# Patient Record
Sex: FEMALE | Race: WHITE | Employment: OTHER | URBAN - METROPOLITAN AREA
[De-identification: names, ages, dates, MRNs, and addresses within clinical notes are randomized per-mention and may not be internally consistent; named-entity substitution may affect disease eponyms.]

---

## 2021-02-24 ENCOUNTER — HOSPITAL ENCOUNTER (OUTPATIENT)
Dept: PREADMISSION TESTING | Age: 41
Discharge: HOME | End: 2021-02-24
Payer: COMMERCIAL

## 2021-02-24 PROCEDURE — U0005 INFEC AGEN DETEC AMPLI PROBE: HCPCS

## 2021-02-25 LAB — SARS-COV-2, COV2NT: NOT DETECTED

## 2021-03-01 ENCOUNTER — ANESTHESIA EVENT (OUTPATIENT)
Dept: SURGERY | Age: 41
End: 2021-03-01
Payer: COMMERCIAL

## 2021-03-02 ENCOUNTER — ANESTHESIA (OUTPATIENT)
Dept: SURGERY | Age: 41
End: 2021-03-02
Payer: COMMERCIAL

## 2021-03-02 ENCOUNTER — APPOINTMENT (OUTPATIENT)
Dept: GENERAL RADIOLOGY | Age: 41
End: 2021-03-02
Attending: UROLOGY
Payer: COMMERCIAL

## 2021-03-02 ENCOUNTER — HOSPITAL ENCOUNTER (OUTPATIENT)
Age: 41
Setting detail: OUTPATIENT SURGERY
Discharge: HOME | End: 2021-03-02
Attending: UROLOGY | Admitting: UROLOGY
Payer: COMMERCIAL

## 2021-03-02 VITALS
SYSTOLIC BLOOD PRESSURE: 153 MMHG | WEIGHT: 203 LBS | DIASTOLIC BLOOD PRESSURE: 88 MMHG | RESPIRATION RATE: 16 BRPM | HEART RATE: 81 BPM | TEMPERATURE: 98.2 F | OXYGEN SATURATION: 95 % | BODY MASS INDEX: 30.77 KG/M2 | HEIGHT: 68 IN

## 2021-03-02 DIAGNOSIS — N13.2 URETERAL STONE WITH HYDRONEPHROSIS: Primary | ICD-10-CM

## 2021-03-02 LAB
ATRIAL RATE: 77 BPM
CALCULATED P AXIS, ECG09: 60 DEGREES
CALCULATED R AXIS, ECG10: -102 DEGREES
CALCULATED T AXIS, ECG11: 51 DEGREES
DIAGNOSIS, 93000: NORMAL
HCG UR QL: NEGATIVE
P-R INTERVAL, ECG05: 156 MS
Q-T INTERVAL, ECG07: 386 MS
QRS DURATION, ECG06: 96 MS
QTC CALCULATION (BEZET), ECG08: 436 MS
VENTRICULAR RATE, ECG03: 77 BPM

## 2021-03-02 PROCEDURE — 74011250637 HC RX REV CODE- 250/637: Performed by: SPECIALIST

## 2021-03-02 PROCEDURE — C1758 CATHETER, URETERAL: HCPCS | Performed by: UROLOGY

## 2021-03-02 PROCEDURE — 74011000636 HC RX REV CODE- 636: Performed by: UROLOGY

## 2021-03-02 PROCEDURE — 77030006974 HC BSKT URET RTVR BSC -C: Performed by: UROLOGY

## 2021-03-02 PROCEDURE — 74011250636 HC RX REV CODE- 250/636: Performed by: UROLOGY

## 2021-03-02 PROCEDURE — 74011000250 HC RX REV CODE- 250: Performed by: SPECIALIST

## 2021-03-02 PROCEDURE — 81025 URINE PREGNANCY TEST: CPT

## 2021-03-02 PROCEDURE — 74011250636 HC RX REV CODE- 250/636: Performed by: SPECIALIST

## 2021-03-02 PROCEDURE — C2617 STENT, NON-COR, TEM W/O DEL: HCPCS | Performed by: UROLOGY

## 2021-03-02 PROCEDURE — C1726 CATH, BAL DIL, NON-VASCULAR: HCPCS | Performed by: UROLOGY

## 2021-03-02 PROCEDURE — 77030014023 HC SYR INFL LVEEN BSC -B: Performed by: UROLOGY

## 2021-03-02 PROCEDURE — 77010033678 HC OXYGEN DAILY

## 2021-03-02 PROCEDURE — 93005 ELECTROCARDIOGRAM TRACING: CPT

## 2021-03-02 PROCEDURE — 76010000138 HC OR TIME 0.5 TO 1 HR: Performed by: UROLOGY

## 2021-03-02 PROCEDURE — 74420 UROGRAPHY RTRGR +-KUB: CPT

## 2021-03-02 PROCEDURE — 77030020782 HC GWN BAIR PAWS FLX 3M -B: Performed by: UROLOGY

## 2021-03-02 PROCEDURE — 76060000032 HC ANESTHESIA 0.5 TO 1 HR: Performed by: UROLOGY

## 2021-03-02 PROCEDURE — 82360 CALCULUS ASSAY QUANT: CPT

## 2021-03-02 PROCEDURE — 76210000006 HC OR PH I REC 0.5 TO 1 HR: Performed by: UROLOGY

## 2021-03-02 PROCEDURE — 77030010103 HC SEAL PRT ENDOSC OCOA -B: Performed by: UROLOGY

## 2021-03-02 PROCEDURE — 77030040361 HC SLV COMPR DVT MDII -B: Performed by: UROLOGY

## 2021-03-02 PROCEDURE — 76210000021 HC REC RM PH II 0.5 TO 1 HR: Performed by: UROLOGY

## 2021-03-02 PROCEDURE — 2709999900 HC NON-CHARGEABLE SUPPLY: Performed by: UROLOGY

## 2021-03-02 PROCEDURE — C1769 GUIDE WIRE: HCPCS | Performed by: UROLOGY

## 2021-03-02 DEVICE — URETERAL STENT
Type: IMPLANTABLE DEVICE | Site: URETER | Status: FUNCTIONAL
Brand: POLARIS™ ULTRA

## 2021-03-02 RX ORDER — FENTANYL CITRATE 50 UG/ML
25 INJECTION, SOLUTION INTRAMUSCULAR; INTRAVENOUS
Status: DISCONTINUED | OUTPATIENT
Start: 2021-03-02 | End: 2021-03-02 | Stop reason: HOSPADM

## 2021-03-02 RX ORDER — MIDAZOLAM HYDROCHLORIDE 1 MG/ML
INJECTION, SOLUTION INTRAMUSCULAR; INTRAVENOUS AS NEEDED
Status: DISCONTINUED | OUTPATIENT
Start: 2021-03-02 | End: 2021-03-02 | Stop reason: HOSPADM

## 2021-03-02 RX ORDER — LIDOCAINE HYDROCHLORIDE 20 MG/ML
INJECTION, SOLUTION EPIDURAL; INFILTRATION; INTRACAUDAL; PERINEURAL AS NEEDED
Status: DISCONTINUED | OUTPATIENT
Start: 2021-03-02 | End: 2021-03-02 | Stop reason: HOSPADM

## 2021-03-02 RX ORDER — LABETALOL HYDROCHLORIDE 5 MG/ML
10 INJECTION, SOLUTION INTRAVENOUS ONCE
Status: DISCONTINUED | OUTPATIENT
Start: 2021-03-02 | End: 2021-03-02 | Stop reason: HOSPADM

## 2021-03-02 RX ORDER — CEFDINIR 300 MG/1
300 CAPSULE ORAL 2 TIMES DAILY
Qty: 6 CAP | Refills: 0 | Status: SHIPPED | OUTPATIENT
Start: 2021-03-02 | End: 2021-03-05

## 2021-03-02 RX ORDER — DEXAMETHASONE SODIUM PHOSPHATE 4 MG/ML
INJECTION, SOLUTION INTRA-ARTICULAR; INTRALESIONAL; INTRAMUSCULAR; INTRAVENOUS; SOFT TISSUE AS NEEDED
Status: DISCONTINUED | OUTPATIENT
Start: 2021-03-02 | End: 2021-03-02 | Stop reason: HOSPADM

## 2021-03-02 RX ORDER — ONDANSETRON 2 MG/ML
4 INJECTION INTRAMUSCULAR; INTRAVENOUS ONCE
Status: DISCONTINUED | OUTPATIENT
Start: 2021-03-02 | End: 2021-03-02 | Stop reason: HOSPADM

## 2021-03-02 RX ORDER — OXYCODONE AND ACETAMINOPHEN 5; 325 MG/1; MG/1
1 TABLET ORAL
Qty: 20 TAB | Refills: 0 | Status: SHIPPED | OUTPATIENT
Start: 2021-03-02 | End: 2021-03-07

## 2021-03-02 RX ORDER — SODIUM CHLORIDE, SODIUM LACTATE, POTASSIUM CHLORIDE, CALCIUM CHLORIDE 600; 310; 30; 20 MG/100ML; MG/100ML; MG/100ML; MG/100ML
125 INJECTION, SOLUTION INTRAVENOUS CONTINUOUS
Status: DISCONTINUED | OUTPATIENT
Start: 2021-03-02 | End: 2021-03-02 | Stop reason: HOSPADM

## 2021-03-02 RX ORDER — SODIUM CHLORIDE, SODIUM LACTATE, POTASSIUM CHLORIDE, CALCIUM CHLORIDE 600; 310; 30; 20 MG/100ML; MG/100ML; MG/100ML; MG/100ML
50 INJECTION, SOLUTION INTRAVENOUS CONTINUOUS
Status: DISCONTINUED | OUTPATIENT
Start: 2021-03-02 | End: 2021-03-02 | Stop reason: HOSPADM

## 2021-03-02 RX ORDER — PROPOFOL 10 MG/ML
INJECTION, EMULSION INTRAVENOUS AS NEEDED
Status: DISCONTINUED | OUTPATIENT
Start: 2021-03-02 | End: 2021-03-02 | Stop reason: HOSPADM

## 2021-03-02 RX ORDER — FENTANYL CITRATE 50 UG/ML
INJECTION, SOLUTION INTRAMUSCULAR; INTRAVENOUS AS NEEDED
Status: DISCONTINUED | OUTPATIENT
Start: 2021-03-02 | End: 2021-03-02 | Stop reason: HOSPADM

## 2021-03-02 RX ORDER — CEFAZOLIN SODIUM/WATER 2 G/20 ML
2 SYRINGE (ML) INTRAVENOUS ONCE
Status: COMPLETED | OUTPATIENT
Start: 2021-03-02 | End: 2021-03-02

## 2021-03-02 RX ORDER — HYDROMORPHONE HYDROCHLORIDE 1 MG/ML
0.5 INJECTION, SOLUTION INTRAMUSCULAR; INTRAVENOUS; SUBCUTANEOUS
Status: DISCONTINUED | OUTPATIENT
Start: 2021-03-02 | End: 2021-03-02 | Stop reason: HOSPADM

## 2021-03-02 RX ORDER — KETAMINE HYDROCHLORIDE 10 MG/ML
INJECTION, SOLUTION INTRAMUSCULAR; INTRAVENOUS AS NEEDED
Status: DISCONTINUED | OUTPATIENT
Start: 2021-03-02 | End: 2021-03-02 | Stop reason: HOSPADM

## 2021-03-02 RX ORDER — ONDANSETRON 2 MG/ML
INJECTION INTRAMUSCULAR; INTRAVENOUS AS NEEDED
Status: DISCONTINUED | OUTPATIENT
Start: 2021-03-02 | End: 2021-03-02 | Stop reason: HOSPADM

## 2021-03-02 RX ORDER — NALOXONE HYDROCHLORIDE 0.4 MG/ML
0.1 INJECTION, SOLUTION INTRAMUSCULAR; INTRAVENOUS; SUBCUTANEOUS
Status: DISCONTINUED | OUTPATIENT
Start: 2021-03-02 | End: 2021-03-02 | Stop reason: HOSPADM

## 2021-03-02 RX ORDER — OXYCODONE AND ACETAMINOPHEN 5; 325 MG/1; MG/1
1 TABLET ORAL AS NEEDED
Status: DISCONTINUED | OUTPATIENT
Start: 2021-03-02 | End: 2021-03-02 | Stop reason: HOSPADM

## 2021-03-02 RX ADMIN — KETAMINE HYDROCHLORIDE 10 MG: 10 INJECTION, SOLUTION INTRAMUSCULAR; INTRAVENOUS at 12:55

## 2021-03-02 RX ADMIN — MIDAZOLAM 2 MG: 1 INJECTION INTRAMUSCULAR; INTRAVENOUS at 12:50

## 2021-03-02 RX ADMIN — LIDOCAINE HYDROCHLORIDE 60 MG: 20 INJECTION, SOLUTION EPIDURAL; INFILTRATION; INTRACAUDAL; PERINEURAL at 12:56

## 2021-03-02 RX ADMIN — DEXAMETHASONE SODIUM PHOSPHATE 4 MG: 4 INJECTION, SOLUTION INTRAMUSCULAR; INTRAVENOUS at 13:01

## 2021-03-02 RX ADMIN — FENTANYL CITRATE 25 MCG: 50 INJECTION, SOLUTION INTRAMUSCULAR; INTRAVENOUS at 13:15

## 2021-03-02 RX ADMIN — CEFAZOLIN 2 G: 1 INJECTION, POWDER, FOR SOLUTION INTRAVENOUS at 12:50

## 2021-03-02 RX ADMIN — OXYCODONE HYDROCHLORIDE AND ACETAMINOPHEN 1 TABLET: 5; 325 TABLET ORAL at 14:59

## 2021-03-02 RX ADMIN — PROPOFOL 160 MG: 10 INJECTION, EMULSION INTRAVENOUS at 12:56

## 2021-03-02 RX ADMIN — SODIUM CHLORIDE, SODIUM LACTATE, POTASSIUM CHLORIDE, AND CALCIUM CHLORIDE 125 ML/HR: 600; 310; 30; 20 INJECTION, SOLUTION INTRAVENOUS at 10:50

## 2021-03-02 RX ADMIN — KETAMINE HYDROCHLORIDE 10 MG: 10 INJECTION, SOLUTION INTRAMUSCULAR; INTRAVENOUS at 12:57

## 2021-03-02 RX ADMIN — FENTANYL CITRATE 25 MCG: 50 INJECTION, SOLUTION INTRAMUSCULAR; INTRAVENOUS at 13:30

## 2021-03-02 RX ADMIN — KETAMINE HYDROCHLORIDE 10 MG: 10 INJECTION, SOLUTION INTRAMUSCULAR; INTRAVENOUS at 12:59

## 2021-03-02 RX ADMIN — FENTANYL CITRATE 25 MCG: 50 INJECTION, SOLUTION INTRAMUSCULAR; INTRAVENOUS at 14:03

## 2021-03-02 RX ADMIN — SODIUM CHLORIDE, SODIUM LACTATE, POTASSIUM CHLORIDE, AND CALCIUM CHLORIDE: 600; 310; 30; 20 INJECTION, SOLUTION INTRAVENOUS at 13:22

## 2021-03-02 RX ADMIN — ONDANSETRON HYDROCHLORIDE 4 MG: 2 INJECTION INTRAMUSCULAR; INTRAVENOUS at 13:01

## 2021-03-02 RX ADMIN — FENTANYL CITRATE 25 MCG: 50 INJECTION, SOLUTION INTRAMUSCULAR; INTRAVENOUS at 13:00

## 2021-03-02 RX ADMIN — FENTANYL CITRATE 25 MCG: 50 INJECTION, SOLUTION INTRAMUSCULAR; INTRAVENOUS at 13:51

## 2021-03-02 RX ADMIN — FENTANYL CITRATE 25 MCG: 50 INJECTION, SOLUTION INTRAMUSCULAR; INTRAVENOUS at 12:55

## 2021-03-02 NOTE — ANESTHESIA PREPROCEDURE EVALUATION
Relevant Problems   No relevant active problems       Anesthetic History   No history of anesthetic complications            Review of Systems / Medical History  Patient summary reviewed, nursing notes reviewed and pertinent labs reviewed    Pulmonary  Within defined limits                 Neuro/Psych   Within defined limits           Cardiovascular  Within defined limits              Pertinent negatives: No hypertension  Exercise tolerance: >4 METS     GI/Hepatic/Renal  Within defined limits              Endo/Other        Arthritis (rheumatoid arthritis)     Other Findings              Physical Exam    Airway  Mallampati: II  TM Distance: 4 - 6 cm  Neck ROM: normal range of motion   Mouth opening: Normal     Cardiovascular               Dental  No notable dental hx       Pulmonary                 Abdominal         Other Findings            Anesthetic Plan    ASA: 2  Anesthesia type: general          Induction: Intravenous  Anesthetic plan and risks discussed with: Patient

## 2021-03-02 NOTE — ANESTHESIA POSTPROCEDURE EVALUATION
Post-Anesthesia Evaluation and Assessment    Cardiovascular Function/Vital Signs  Visit Vitals  BP (!) 150/85   Pulse 80   Temp 37.4 °C (99.4 °F)   Resp 10   Ht 5' 8\" (1.727 m)   Wt 92.1 kg (203 lb)   SpO2 95%   BMI 30.87 kg/m²       Patient is status post Procedure(s):  CYSTOSCOPY,LEFT RETROGRADE PYELOGRAM WITH INTERPRETATION,LEFT  URETEROSCOPY,LASER LITHOTRIPSY WITH HOLMIUM,STENT PLACEMENT W/C-ARM. Nausea/Vomiting: Controlled. Postoperative hydration reviewed and adequate. Pain:  Pain Scale 1: FLACC (03/02/21 1439)  Pain Intensity 1: 0 (03/02/21 1439)   Managed. Neurological Status:   Neuro (WDL): Within Defined Limits (03/02/21 1405)   At baseline. Mental Status and Level of Consciousness: Baseline and appropriate for discharge. Pulmonary Status:   O2 Device: Nasal cannula (03/02/21 1405)   Adequate oxygenation and airway patent. Complications related to anesthesia: None    Post-anesthesia assessment completed. No concerns. Patient has met all discharge requirements.     Signed By: Lynne Cardona MD    March 2, 2021

## 2021-03-02 NOTE — PERIOP NOTES
Reviewed PTA medication list with patient/caregiver and patient/caregiver denies any additional medications. Patient admits to having a responsible adult care for them at home for at least 24 hours after surgery. Patient encouraged to use gown warming system and informed that using said warming gown to regulate body temperature prior to a procedure has been shown to help reduce the risks of blood clots and infection. Patient's pharmacy of choice verified and documented in PTA medication section. Dual skin assessment & fall risk band verification completed with Zechariah Otto RN.

## 2021-03-02 NOTE — H&P
Urology [facility] [address] [address] Tel: [phone] Fax: [phone] Patient : Amira Rosado YOB: 1980 # Detail Type Description 1. Assessment Hydronephrosis with ureteral calculous obstruction (N13.2). Patient Plan She has a 7mm left distal ureteral stone. We discussed medical expulsive therapy vs URS vs ESWL. She wishes to proceed with a cysto, left RPG, left URS with Holmium laser litho and stent. Risk of bleeding, infection, injury and possible need for more than 1 procedure to remain stone free were discussed. 2. Assessment Acute cystitis without hematuria (N30.00). Patient Plan She had a recent UTI. She's on antibiotics and her urine is sent for culture today. 3. Assessment Dysuria (R30.0). Plan Orders UA In Office No Micro to be performed. Obtained on 02/24/2021 and Urine Culture, Routine to be performed. Obtained on 02/24/2021.  
    
 4. Other Orders Orders not associated to today's assessments. Plan Orders Urine Culture, Routine to be performed. Obtained on 02/24/2021. Additional Visit Information This 36year old female presents for Kidney and/or Ureteral Stone. History of Present Illness 1. Kidney and/or Ureteral Aarti Joseph Onset was 1 week ago. Severity level is moderate-severe. The problem is with no change. Associated symptoms include nausea, pain, pelvic pain, dribbling (urinary) and urinary frequency. Pertinent negatives include chills, constipation, diarrhea, fever and vomiting. Additional information: She is on abx for UTUI and kidney stone. [facility] 2/22/2021, personally reviewed) -- 7x4mm stone in the left distal ureter with hydro. José Miguel Chalfant Medical/Surgical/Interim History Reviewed, no change. Last detailed document date:03/03/2020. Problem List 
Problem List reviewed. Problem Description Onset Date Chronic Clinical Status Notes Kidney stone  Y    
 Psoriasis  Y Hypertension  Y Medications (active prior to today) Medication Instructions Start Date Stop Date Refilled Elsewhere Orencia ClickJect 406 mg/mL subcutaneous auto-injector inject 1 milliliter by subcutaneous route  every week 04/03/2020 02/24/2021 04/03/2020 N  
ondansetron 4 mg disintegrating tablet take 2 tablet by oral route  every 12 hours and place on top of the tongue where they will dissolve, then swallow as needed for nausea 07/31/2020 02/24/2021 07/31/2020 N  
hydroxychloroquine 200 mg tablet take 1 tablet by oral route  twice a day 11/13/2020 02/24/2021 11/13/2020 N Plaquenil 200 mg tablet take 1 tablet by oral route two times  every day //   Y Percocet 10 mg-325 mg tablet take 1 tablet by oral route  every 6 hours as needed // 02/24/2021 02/24/2021 Y Zofran 4 mg tablet take 2 tablet by oral route  every 8 hours for 2 days //   Y Uroxatral 10 mg tablet,extended release take 1 tablet by oral route  every day //   Y Medication Reconciliation Medications reconciled today. Medication Reviewed Adherence Medication Name Sig Desc Elsewhere Status  
taking as directed ondansetron 4 mg disintegrating tablet take 2 tablet by oral route  every 12 hours and place on top of the tongue where they will dissolve, then swallow as needed for nausea N Verified  
taking as directed Orencia ClickJect 395 mg/mL subcutaneous auto-injector inject 1 milliliter by subcutaneous route  every week N Verified  
taking as directed hydroxychloroquine 200 mg tablet take 1 tablet by oral route  twice a day N Verified  
taking as directed Plaquenil 200 mg tablet take 1 tablet by oral route two times  every day Y Verified  
taking as directed Zofran 4 mg tablet take 2 tablet by oral route  every 8 hours for 2 days Y Verified  
taking as directed Percocet 10 mg-325 mg tablet take 1 tablet by oral route  every 6 hours as needed Y Verified taking as directed Uroxatral 10 mg tablet,extended release take 1 tablet by oral route  every day Y Verified Allergies Ingredient Reaction (Severity) Medication Name Comment CODEINE Nausea/Vomiting Reviewed, no changes. Family History Reviewed, no changes. Last detailed document date:03/03/2020. Social History Reviewed, no changes. Last detailed document date: 03/03/2020. Review of Systems System Neg/Pos Details Constitutional Positive Pain. Constitutional Negative Chills and Fever. ENMT Negative Ear infections and Sore throat. Eyes Negative Blurred vision, Double vision and Eye pain. Respiratory Negative Asthma, Chronic cough, Dyspnea and Wheezing. Cardio Negative Chest pain. GI Positive Nausea. GI Negative Constipation, Decreased appetite, Diarrhea and Vomiting.  Positive Pelvic pain, Urinary dribbling, Urinary frequency. Endocrine Negative Cold intolerance, Heat intolerance, Increased thirst and Weight loss. Neuro Negative Headache and Tremors. Psych Negative Anxiety and Depression. Integumentary Negative Itching skin and Rash. MS Negative Back pain and Joint pain. Hema/Lymph Negative Easy bleeding. Vital Signs Height Time ft in cm Last Measured Height Position 11:43 AM 5.0 8.00 172.72 02/24/2021 Weight/BSA/BMI Time lb oz kg Context BMI kg/m2 BSA m2  
11:43 .00  88.904  29.80 Blood Pressure Time BP mm/Hg Position Side Site Method Cuff Size 11:43 /106 Temperature/Pulse/Respiration Time Temp F Temp C Temp Site Pulse/min Pattern Resp/ min 11:43 AM 98.00 36.67  103 Measured by Time Measured by  
11:43 AM Mila Hopkins Physical Exam 
Exam Findings Details Constitutional * Overall appearance - Clearly uncomfortable. Neck Exam Normal Inspection - Normal.  
Respiratory Normal Inspection - Normal.  
Abdomen Normal No abdominal tenderness. Genitourinary * CVA tenderness - LT. Genitourinary Normal No Suprapubic tenderness. Extremity Normal No Edema. Psychiatric Normal Orientation - Oriented to time, place, person & situation. Appropriate mood and affect. Patient Education # Patient Education 1. Learning About Diet for Kidney Stone Prevention 2. Kidney Stone: Care Instructions Medications (added, continued, or stopped today) Start Date Medication Directions PRN Status PRN Reason Instruction Stop Date 11/13/2020 hydroxychloroquine 200 mg tablet take 1 tablet by oral route  twice a day N   02/24/2021 02/24/2021 ketorolac 10 mg tablet take 1 tablet by oral route  every 6 hours as needed for up to 5 days total use N     
07/31/2020 ondansetron 4 mg disintegrating tablet take 2 tablet by oral route  every 12 hours and place on top of the tongue where they will dissolve, then swallow as needed for nausea Y nausea  02/24/2021  
94/12/3674 Orencia ClickJect 990 mg/mL subcutaneous auto-injector inject 1 milliliter by subcutaneous route  every week N   02/24/2021 02/24/2021 Percocet 10 mg-325 mg tablet take 1 tablet by oral route  every 6 hours as needed N Percocet 10 mg-325 mg tablet take 1 tablet by oral route  every 6 hours as needed N   02/24/2021 Plaquenil 200 mg tablet take 1 tablet by oral route two times  every day N     
 Uroxatral 10 mg tablet,extended release take 1 tablet by oral route  every day N Zofran 4 mg tablet take 2 tablet by oral route  every 8 hours for 2 days N     
 
Prescription Drug Monitoring Report: Accessed by Jennifer Torre MD on 2/24/2021 12:16:04 PM 
 
Active Patient Care Team Members Name Contact Agency Type Support Role Relationship Active Date Inactive Date Specialty \[facility] [place] [facility]\""   Emergency Contact Spouse Mason Seed   encounter provider    Rheumatology [facility]    Mother Soundra Osgood   Patient provider PCP Soundra Osgood   primary care provider Provider:   
 
 
Marcos Das MD

## 2021-03-02 NOTE — DISCHARGE INSTRUCTIONS
Patient Education        9 Things To Do If You've Been Exposed to COVID-19    Stay home. If you've been exposed, you should stay in quarantine for at least 14 days. Ask your doctor when it's safe to end your quarantine. Don't go to school, work, or public areas. And don't use public transportation, ride-shares, or taxis unless you have no choice. Leave your home only if you need to get medical care. But call the doctor's office first so they know you're coming, and wear a cloth face cover when you go. Call your doctor. Call your doctor or other health professional to let them know that you've been exposed. They might want you to be tested, or they may have other instructions for you. If you become sick, wear a face cover when you are around other people. It can help stop the spread of the virus when you cough or sneeze. Limit contact with people in your home. If possible, stay in a separate bedroom and use a separate bathroom. Avoid contact with pets and other animals. Cover your mouth and nose with a tissue when you cough or sneeze. Then throw it in the trash right away. Wash your hands often, especially after you cough or sneeze. Use soap and water, and scrub for at least 20 seconds. If soap and water aren't available, use an alcohol-based hand . Don't share personal household items. These include bedding, towels, cups and glasses, and eating utensils. Clean and disinfect your home every day. Use household  or disinfectant wipes or sprays. Take special care to clean things that you grab with your hands. These include doorknobs, remote controls, phones, and handles on your refrigerator and microwave. And don't forget countertops, tabletops, bathrooms, and computer keyboards. Current as of: December 18, 2020               Content Version: 12.7  © 2006-2021 [employer].    Care instructions adapted under license by [employer] (which disclaims liability or warranty for this information). If you have questions about a medical condition or this instruction, always ask your healthcare professional. [place] [address] any warranty or liability for your use of this information. DISCHARGE SUMMARY from Nurse    PATIENT INSTRUCTIONS:    After general anesthesia or intravenous sedation, for 24 hours or while taking prescription Narcotics:  · Limit your activities  · Do not drive and operate hazardous machinery  · Do not make important personal or business decisions  · Do  not drink alcoholic beverages  · If you have not urinated within 8 hours after discharge, please contact your surgeon on call. Report the following to your surgeon:  · Excessive pain, swelling, redness or odor of or around the surgical area  · Temperature over 100.5  · Nausea and vomiting lasting longer than 4 hours or if unable to take medications  · Any signs of decreased circulation or nerve impairment to extremity: change in color, persistent  numbness, tingling, coldness or increase pain  · Any questions    What to do at Home:  REGULAR DIET [address] [place]   [address] WILL CALL REGARDING A POST OP CHECK UP    If you experience any of the following symptoms heavy bleeding, unable to void, fevers, severe pain, please follow up with dr Amara Gibbs    *  Please give a list of your current medications to your Primary Care Provider. *  Please update this list whenever your medications are discontinued, doses are      changed, or new medications (including over-the-counter products) are added. *  Please carry medication information at all times in case of emergency situations. These are general instructions for a healthy lifestyle:    No smoking/ No tobacco products/ Avoid exposure to second hand smoke  Surgeon General's Warning:  Quitting smoking now greatly reduces serious risk to your health.     Obesity, smoking, and sedentary lifestyle greatly increases your risk for illness    A healthy diet, regular physical exercise & weight monitoring are important for maintaining a healthy lifestyle    You may be retaining fluid if you have a history of heart failure or if you experience any of the following symptoms:  Weight gain of 3 pounds or more overnight or 5 pounds in a week, increased swelling in our hands or feet or shortness of breath while lying flat in bed. Please call your doctor as soon as you notice any of these symptoms; do not wait until your next office visit. The discharge information has been reviewed with the patient and caregiver. The patient and caregiver verbalized understanding. Discharge medications reviewed with the patient and caregiver and appropriate educational materials and side effects teaching were provided.   ___________________________________________________________________________________________________________________________________    Patient armband removed and shredded

## 2021-03-02 NOTE — BRIEF OP NOTE
Brief Postoperative Note    Patient: Tomas Noonan  YOB: 1980  MRN: [id number]    Date of Procedure: 3/2/2021     Pre-Op Diagnosis: HYDRONEPHROSIS WITH URETERAL STONE    Post-Op Diagnosis: Same as preoperative diagnosis. Procedure(s):  CYSTOSCOPY,LEFT RETROGRADE PYELOGRAM WITH INTERPRETATION,LEFT  URETEROSCOPY,LASER LITHOTRIPSY WITH HOLMIUM,STENT PLACEMENT W/C-ARM    Surgeon(s):  Maria L Ferrari MD    Surgical Assistant: None    Anesthesia: General     Estimated Blood Loss (mL): Minimal    Complications: None    Specimens:   ID Type Source Tests Collected by Time Destination   1 : LEFT KIDNEY STONES Fresh Kidney  Maria L Ferrari MD 3/2/2021 7857 Pathology        Implants:   Implant Name Type Inv. Item Serial No.  Lot No. LRB No. Used Action   Aby Purl PERCFLX [id number] -- [facility] - [id number]  STENT URET PERCFLX [id number] -- [id number] [facility] UR[facility]_ [id number] Left 1 Implanted       Drains: * No LDAs found *    Findings: TIGHT DISTAL URETER. LARGE STONE IMPACTED INTO URETER.   LASERED AND EXTRACTED    Electronically Signed by Lidia Gonzales MD on 3/2/2021 at 1:54 PM

## 2021-03-03 NOTE — OP NOTES
[facility]  OPERATIVE REPORT    Name:  Prabhakar Henao  MR#:   [id number]  :  1980  ACCOUNT #:  [de-identified]  DATE OF SERVICE:  2021    PREOPERATIVE DIAGNOSIS:  Hydronephrosis with ureteral stone. POSTOPERATIVE DIAGNOSIS:  Hydronephrosis with ureteral stone. PROCEDURE PERFORMED:  Cystoscopy, left retrograde pyelogram with interpretation, left ureteroscopy with holmium laser lithotripsy and stent. SURGEON:  Greer Perez MD    ASSISTANT:  None. ANESTHESIA:  General.    COMPLICATIONS:  None. SPECIMENS REMOVED:  Left kidney stone. IMPLANTS:  5-Luxembourgish 22-cm stent. DRAIN:  None. ESTIMATED BLOOD LOSS:  Minimal.    FINDINGS:  The patient had a tight distal ureter. There was a large stone that was impacted into the ureter. It was lasered and extracted and no significant fragments remained. CLINICAL NOTE:  The patient is a 51-year-old female with sudden onset of left-sided flank pain who was found to have a large left distal ureteral stone. She presents for ureteroscopy. PROCEDURE:  Preoperatively, the risks and benefits of surgery were described to the patient. The risks included but are limited to bleeding, infection, injury to the bladder, injury to the ureter, injury to the kidney, possible need for future procedure to be made stone-free. The patient understood the risks and signed the informed consent. The patient was taken to the operative room and placed on the OR table in supine position. She was administered general anesthetic. She was administered intravenous antibiotics. She was placed in dorsal lithotomy position and prepped and draped in the usual sterile manner. A 22-Luxembourgish cystoscope and sheath were then inserted transurethrally atraumatically under direct vision using a 30-degree lens. Panendoscopy was done. Bilateral ureteral orifices were in normal anatomic position.   There were no stones, no tumors, no areas of concern within the bladder. The left ureteral orifice was cannulated with a 5-Paraguayan open-ended ureteral catheter. Retrograde pyelogram was done in usual manner using 12 mL of Visipaque dye. There was a filling defect in the distal ureter consistent with stone. Proximal to this, there was hydroureter. I could never get all the dye sent up towards the kidney because the stone was so obstructive. I then passed a sensor wire by the stone and up towards the kidney. This wire was clearly within the ureter as seen on fluoroscopy. I tried passing the open-ended catheter over the wire, but I could not get it as things were just too tight. I then removed the open-ended catheter and I removed the scope leaving the safety wire in place. I then passed a semi-rigid ureteroscope transurethrally atraumatically under direct vision into the bladder and cannulated the distal ureter. I was able to advance the scope up until I was near the stone, but I could not get totally up to it because of the ureter which was very narrowed in that area. I tried multiple times and very patiently tried to pass the scope over a wire and just could not do it. I thus passed the wire through the scope and beyond the stone and removed the scope. I then passed a 5-cm, 15-Paraguayan UroMax dilator over the wire. I could see the stone easily on fluoroscopy, and I put the tip of the wire just distal to the tip of the stone so the stone would not be in the way of inflation. I then inflated the balloon and held it there for 30 seconds and deflated it. I then removed the balloon. I then inserted the semi-rigid ureteroscope without difficulty transurethrally into the bladder and up the ureter and I came right upon the stone. The stone was impacted into the ureter, but there was no ureteral injury. The area of narrowing was well dilated.   Next, I used a 272-micron holmium laser fiber and I broke up the stone into small fragments and extracted them out with a ZeroTip basket. At the end of the case, the ureter was intact. There were no significant fragments or problems along the length of the ureter. I then slowly backed the scope down the ureter. The ureter remained again intact and unharmed. I reinserted the cystoscope. Over the safety wire, I passed a 5-Maori 22-cm stent. The wire was removed. Fluoroscopy confirmed the proximal coil was up towards the kidney, and the distal coil was in the bladder as seen under direct vision. At this point, the scope was removed and the patient was taken out of lithotomy position and taken to Recovery in stable condition.       MD MIKEY [email or web address]  D:  03/02/2021 13:59  T:  03/02/2021 23:42  JOB #:  [id number]

## 2021-03-11 LAB
CALCIUM OXALATE DIHYDRATE MFR STONE IR: 30 %
COLOR STONE: NORMAL
COM MFR STONE: 60 %
COMMENT, 519994: NORMAL
COMPOSITION, 120440: NORMAL
DISCLAIMER, STO32L: NORMAL
HYDROXYAPATITE: 10 %
NIDUS STONE QL: NORMAL
PHOTO, 120675: NORMAL
PLEASE NOTE, 130422: NORMAL
SHELL, 120438: NORMAL
SIZE STONE: NORMAL MM
SPECIMEN SOURCE: NORMAL
SURFACE CRYSTALS, 120439: NORMAL
WT STONE: 15 MG

## 2022-04-12 ENCOUNTER — HOSPITAL ENCOUNTER (OUTPATIENT)
Dept: LAB | Age: 42
Discharge: HOME | End: 2022-04-12
Payer: COMMERCIAL

## 2022-04-12 ENCOUNTER — HOSPITAL ENCOUNTER (OUTPATIENT)
Dept: NON INVASIVE DIAGNOSTICS | Age: 42
Discharge: HOME | End: 2022-04-12
Payer: COMMERCIAL

## 2022-04-12 ENCOUNTER — TRANSCRIBE ORDER (OUTPATIENT)
Dept: REGISTRATION | Age: 42
End: 2022-04-12

## 2022-04-12 DIAGNOSIS — M21.6X1 ACQUIRED EXTERNAL ROTATION OF FOOT, RIGHT: ICD-10-CM

## 2022-04-12 DIAGNOSIS — M89.8X7 PAIN IN METATARSUS: ICD-10-CM

## 2022-04-12 DIAGNOSIS — M21.621 BUNIONETTE OF RIGHT FOOT: Primary | ICD-10-CM

## 2022-04-12 DIAGNOSIS — M21.621 BUNIONETTE OF RIGHT FOOT: ICD-10-CM

## 2022-04-12 LAB
ANION GAP SERPL CALC-SCNC: 6 MMOL/L (ref 3–18)
BUN SERPL-MCNC: 16 MG/DL (ref 7–18)
BUN/CREAT SERPL: 23 (ref 12–20)
CALCIUM SERPL-MCNC: 8.7 MG/DL (ref 8.5–10.1)
CHLORIDE SERPL-SCNC: 107 MMOL/L (ref 100–111)
CO2 SERPL-SCNC: 28 MMOL/L (ref 21–32)
CREAT SERPL-MCNC: 0.7 MG/DL (ref 0.6–1.3)
GLUCOSE SERPL-MCNC: 92 MG/DL (ref 74–99)
HCT VFR BLD AUTO: 39.8 % (ref 35–45)
HGB BLD-MCNC: 13.6 G/DL (ref 12–16)
POTASSIUM SERPL-SCNC: 4.3 MMOL/L (ref 3.5–5.5)
SODIUM SERPL-SCNC: 141 MMOL/L (ref 136–145)

## 2022-04-12 PROCEDURE — 36415 COLL VENOUS BLD VENIPUNCTURE: CPT

## 2022-04-12 PROCEDURE — 80048 BASIC METABOLIC PNL TOTAL CA: CPT

## 2022-04-12 PROCEDURE — 93005 ELECTROCARDIOGRAM TRACING: CPT

## 2022-04-12 PROCEDURE — 85018 HEMOGLOBIN: CPT

## 2022-04-13 LAB
ATRIAL RATE: 85 BPM
CALCULATED P AXIS, ECG09: 62 DEGREES
CALCULATED R AXIS, ECG10: 156 DEGREES
CALCULATED T AXIS, ECG11: 54 DEGREES
DIAGNOSIS, 93000: NORMAL
P-R INTERVAL, ECG05: 150 MS
Q-T INTERVAL, ECG07: 366 MS
QRS DURATION, ECG06: 90 MS
QTC CALCULATION (BEZET), ECG08: 435 MS
VENTRICULAR RATE, ECG03: 85 BPM

## 2022-04-28 ENCOUNTER — HOSPITAL ENCOUNTER (OUTPATIENT)
Dept: LAB | Age: 42
Discharge: HOME | End: 2022-04-28
Payer: COMMERCIAL

## 2022-04-28 PROCEDURE — 88304 TISSUE EXAM BY PATHOLOGIST: CPT

## 2022-04-28 PROCEDURE — 88311 DECALCIFY TISSUE: CPT

## (undated) DEVICE — STERILE POLYISOPRENE POWDER-FREE SURGICAL GLOVES: Brand: PROTEXIS

## (undated) DEVICE — DRAPE XR C ARM 41X74IN LF --

## (undated) DEVICE — BALLOON DILATATION CATHETER: Brand: UROMAX ULTRA

## (undated) DEVICE — NITINOL STONE RETRIEVAL BASKET: Brand: ZERO TIP

## (undated) DEVICE — CYSTO PACK: Brand: MEDLINE INDUSTRIES, INC.

## (undated) DEVICE — BAG PRESSURE INFUSION 3 W STOPCOCK 3000 CC PREMIERPRO DISP

## (undated) DEVICE — INFLATION DEVICE: Brand: ENCORE 26

## (undated) DEVICE — TRAP MUCUS SPECIMEN 40ML -- MEDICHOICE

## (undated) DEVICE — DUAL LUMEN URETERAL CATHETER

## (undated) DEVICE — GARMENT,MEDLINE,DVT,INT,CALF,MED, GEN2: Brand: MEDLINE

## (undated) DEVICE — STRAP,POSITIONING,KNEE/BODY,FOAM,4X60": Brand: MEDLINE

## (undated) DEVICE — SOLUTION IRRIG 3000ML 0.9% SOD CHL FLX CONT 0797208] ICU MEDICAL INC]

## (undated) DEVICE — SEAL ENDOSCP INSTR 7FR BX SELF SEAL

## (undated) DEVICE — GDWIRE 3CM FLX-TIP 0.038X150CM -- BX/5 SENSOR

## (undated) DEVICE — CATHETER URET L70CM OD6FR OPN END FLEXIMA